# Patient Record
Sex: MALE | Race: WHITE | Employment: STUDENT | URBAN - METROPOLITAN AREA
[De-identification: names, ages, dates, MRNs, and addresses within clinical notes are randomized per-mention and may not be internally consistent; named-entity substitution may affect disease eponyms.]

---
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
## 2024-11-19 ENCOUNTER — OFFICE VISIT (OUTPATIENT)
Dept: FAMILY MEDICINE CLINIC | Facility: CLINIC | Age: 24
End: 2024-11-19
Payer: COMMERCIAL

## 2024-11-19 VITALS
SYSTOLIC BLOOD PRESSURE: 116 MMHG | BODY MASS INDEX: 30.13 KG/M2 | HEART RATE: 50 BPM | RESPIRATION RATE: 17 BRPM | DIASTOLIC BLOOD PRESSURE: 66 MMHG | WEIGHT: 192 LBS | HEIGHT: 67 IN

## 2024-11-19 DIAGNOSIS — R55 VASOVAGAL EPISODE: Primary | ICD-10-CM

## 2024-11-19 PROCEDURE — 99212 OFFICE O/P EST SF 10 MIN: CPT | Performed by: FAMILY MEDICINE

## 2024-11-19 NOTE — PROGRESS NOTES
Blood pressure 116/66, pulse 50, resp. rate 17, height 5' 7\" (1.702 m), weight 192 lb (87.1 kg).      Patient presents today following up for hospital stay overnight for syncopal episode.  He is a [place] .  He was told that his best friend was murdered and he lost consciousness and hit his head.  He was kept overnight at [facility] and released home.  Denies any vomiting or headache.  No pain at this time.  Does not take daily medicines.    Objective appropriate mood and affect    Reviewed medical records from [facility]    Assessment status post syncopal episode vasovagal after he was told his friend was dead.    Plan patient advised to follow-up with behavioral health he will receive his behavioral health through [employer].

## 2024-12-27 ENCOUNTER — TELEPHONE (OUTPATIENT)
Dept: FAMILY MEDICINE CLINIC | Facility: CLINIC | Age: 24
End: 2024-12-27

## 2024-12-27 NOTE — TELEPHONE ENCOUNTER
Patient states he has not received the letter to return to work with no restrictions from Dr. Vick. Needs it before Monday.

## 2024-12-28 NOTE — TELEPHONE ENCOUNTER
Patient called to follow up and advise he still needs note for work.     Patient starts at 12pm and will need to print it out to turn it in first thing Monday.

## (undated) DEVICE — TETRA-FLEX CF WOVEN LATEX FREE ELASTIC BANDAGE 2" X 5.5 YD: Brand: TETRA-FLEX™CF

## (undated) DEVICE — STERILE TETRA-FLEX CF, ELASTIC BANDAGEE, 3" X 5.5YD: Brand: TETRA-FLEX™CF

## (undated) DEVICE — DRESSING CRTY CNFRM 3IN

## (undated) DEVICE — CLIPPER BLADE 3M

## (undated) DEVICE — GOWN SURG AERO BLUE PERF LG

## (undated) DEVICE — STERILE LATEX POWDER-FREE SURGICAL GLOVES WITH HYDROGEL COATING, SMOOTH FINISH, STRAIGHT FINGER: Brand: PROTEXIS

## (undated) DEVICE — TRAY SRGPRP PVP IOD WT SCRB SM

## (undated) DEVICE — ZIMMER® STERILE DISPOSABLE TOURNIQUET CUFF WITH PLC, DUAL PORT, SINGLE BLADDER, 18 IN. (46 CM)

## (undated) DEVICE — SOLUTION SURG DURA PREP HAZMAT

## (undated) DEVICE — SPECIALTY ARM SLING: Brand: DEROYAL

## (undated) DEVICE — IMPLANTABLE DEVICE
Type: IMPLANTABLE DEVICE | Site: FINGER | Status: NON-FUNCTIONAL
Removed: 2017-04-19

## (undated) DEVICE — STERILE TETRA-FLEX CF, ELASTIC BANDAGE, 4" X 5.5YD: Brand: TETRA-FLEX™CF

## (undated) DEVICE — REM POLYHESIVE ADULT PATIENT RETURN ELECTRODE: Brand: VALLEYLAB

## (undated) DEVICE — WEBRIL COTTON UNDERCAST PADDING: Brand: WEBRIL

## (undated) DEVICE — SUCTION CANISTER, 3000CC,SAFELINER: Brand: DEROYAL

## (undated) DEVICE — UPPER EXTREMITY: Brand: MEDLINE INDUSTRIES, INC.

## (undated) DEVICE — BATTERY

## (undated) DEVICE — SUTURE ETHILON 4-0 699G

## (undated) DEVICE — SOL  .9 1000ML BTL

## (undated) DEVICE — STERILE LATEX POWDER-FREE SURGICAL GLOVESWITH NITRILE COATING: Brand: PROTEXIS

## (undated) DEVICE — SOL H2O 1000ML BTL

## (undated) DEVICE — 3M™ IOBAN™ 2 ANTIMICROBIAL INCISE DRAPE 6650EZ: Brand: IOBAN™ 2

## (undated) DEVICE — GLV RAD SURG 8.5

## (undated) DEVICE — COTTON UNDERCAST PADDING,REGULAR FINISH: Brand: WEBRIL

## (undated) DEVICE — STERILE TETRA-FLEX CF, ELASTIC BANDAGE, 2" X 5.5YD: Brand: TETRA-FLEX™CF

## (undated) DEVICE — GAUZE SPONGES,12 PLY: Brand: CURITY

## (undated) DEVICE — DRAPE SHEET LG

## (undated) DEVICE — KENDALL SCD EXPRESS SLEEVES, KNEE LENGTH, LARGE: Brand: KENDALL SCD

## (undated) DEVICE — DRAPE C-ARM UNIVERSAL

## (undated) DEVICE — STRETCH BANDAGE: Brand: CURITY

## (undated) DEVICE — STERILE TETRA-FLEX CF, ELASTIC BANDAGE LATEX FREE 6IN X5.5 YD: Brand: TETRA-FLEX™CF

## (undated) DEVICE — BIPOLAR FORCEPS CORD,BANANA LEADS: Brand: VALLEYLAB

## (undated) DEVICE — COVER SGL STRL LGHT HNDL BLU

## (undated) DEVICE — KENDALL SCD EXPRESS SLEEVES, KNEE LENGTH, MEDIUM: Brand: KENDALL SCD

## (undated) DEVICE — 3M™ COBAN™ NL STERILE NON-LATEX SELF-ADHERENT WRAP, 2084S, 4 IN X 5 YD (10 CM X 4,5 M), 18 ROLLS/CASE: Brand: 3M™ COBAN™

## (undated) DEVICE — SPLINT PLASTER 4

## (undated) DEVICE — SUTURE ETHILON 5-0 PS-2

## (undated) DEVICE — FLEXIBLE YANKAUER,MEDIUM TIP, NO VACUUM CONTROL: Brand: ARGYLE

## (undated) DEVICE — DRAPE MN CARM

## (undated) DEVICE — UNDYED BRAIDED (POLYGLACTIN 910), SYNTHETIC ABSORBABLE SUTURE: Brand: COATED VICRYL

## (undated) NOTE — LETTER
11/19/2024              Dayron Wren        [address] [place]        [place] [address]         Dear Dayron,    Please advise your employer that you will need behavioral health evaluation prior to your return to work.      Sincerely,      Camilo Vick, DO          Document electronically generated by:  Camilo Vick, DO

## (undated) NOTE — MR AVS SNAPSHOT
[address] at [facility]  [address]  [facility], [address] [facility] [place]                Thank you for choosing us for your health care visit with Jules Verma MD.  We are glad to serve you and happy to provide help with. Keep in mind that a drop in school performance can be a sign of other problems. · Friendships. Do you like your child’s friends? Do the friendships seem healthy?  Make sure to talk to your teen about who his or her friends are and how they spend No matter how your teen acts, he or she still needs a parent. Nutrition and exercise tips  Your teenager likely makes his or her own decisions about what to eat and how to spend free time.  You can’t always have the final say, but you can encourage healthy milk are the best choices. Hygiene tips  · Teenagers should bathe or shower daily and use deodorant. · Let the health care provider know if you or your teen have questions about hygiene or acne.   · Bring your teen to the dentist at least twice a year for · When your teen is old enough for a ’s license, encourage safe driving. Teach your teen to always wear a seat belt, drive the speed limit, and follow the rules of the road.  Do not allow your teenager to text or talk on a cell phone while driving. (A service agency, or hospital. Cedric Kim your teen that his or her pain can be eased. Offer your love and support.  If your teen talks about death or suicide, seek help right away.      Next checkup at: _______________________________     PARENT NOTES:  Date [place]

## (undated) NOTE — LETTER
12/28/2024          To Whom It May Concern:    Dayron Wren is currently under my medical care.  He may return to work on 12/30/2024.  Activity is restricted as follows: none.    If you require additional information please contact our office.        Sincerely,    Camilo Vick, DO

## (undated) NOTE — IP AVS SNAPSHOT
[facility]    [address], [place], [place] ~ [phone]                Discharge Summary   4/19/2017    Michelle Nielsen           Admission Information        Provider Department    4/19/2017 Marielle Merritt MD [facility] HOME CARE INSTRUCTIONS: POST-OP ANESTHESIA  The medication that you received for sedation or general anesthesia can last up to 24 hours. Your judgmentand reflexes may be altered, even if you feel like your normal self.       We Recommend:   · Do not Do not remove the dressing until first follow up appointment  Notify Dr. Kendy Hilliard for any issues questions or concerns    Discharge References/Attachments     DISCHARGE INSTRUCTIONS: AFTER YOUR SURGERY (ENGLISH)      Instructions and Information about Your Medicaid plans. To get signed up and covered, please call [phone] and ask to get set up for an insurance coverage that is in-network with [facility].         Visit Information        Department Dept Phone    4/19/2017  1:35 PM [facility] Pre-Op